# Patient Record
Sex: FEMALE | Employment: OTHER | URBAN - NONMETROPOLITAN AREA
[De-identification: names, ages, dates, MRNs, and addresses within clinical notes are randomized per-mention and may not be internally consistent; named-entity substitution may affect disease eponyms.]

---

## 2017-12-11 ENCOUNTER — HOSPITAL ENCOUNTER (OUTPATIENT)
Dept: MRI IMAGING | Age: 82
Discharge: HOME | End: 2017-12-11
Payer: MEDICARE

## 2017-12-11 DIAGNOSIS — M51.36 DEGENERATION OF INTERVERTEBRAL DISC BETWEEN FOURTH AND FIFTH LUMBAR VERTEBRAE: ICD-10-CM

## 2017-12-11 DIAGNOSIS — M48.062 SPINAL STENOSIS, LUMBAR REGION WITH NEUROGENIC CLAUDICATION: ICD-10-CM

## 2017-12-11 DIAGNOSIS — M43.16 SPONDYLOLISTHESIS OF LUMBAR REGION: ICD-10-CM

## 2017-12-11 PROCEDURE — 72148 MRI LUMBAR SPINE W/O DYE: CPT

## 2021-04-12 ENCOUNTER — HOSPITAL ENCOUNTER (EMERGENCY)
Age: 86
Discharge: HOME | End: 2021-04-12
Attending: EMERGENCY MEDICINE
Payer: MEDICARE

## 2021-04-12 ENCOUNTER — APPOINTMENT (OUTPATIENT)
Dept: CT IMAGING | Age: 86
End: 2021-04-12
Payer: MEDICARE

## 2021-04-12 VITALS
BODY MASS INDEX: 30.73 KG/M2 | WEIGHT: 180 LBS | TEMPERATURE: 97.6 F | DIASTOLIC BLOOD PRESSURE: 52 MMHG | OXYGEN SATURATION: 99 % | SYSTOLIC BLOOD PRESSURE: 144 MMHG | HEIGHT: 64 IN | RESPIRATION RATE: 16 BRPM | HEART RATE: 67 BPM

## 2021-04-12 DIAGNOSIS — M54.2 NECK PAIN: ICD-10-CM

## 2021-04-12 DIAGNOSIS — S01.511A LIP LACERATION, INITIAL ENCOUNTER: ICD-10-CM
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
## 2021-04-12 PROCEDURE — 6370000000 HC RX 637 (ALT 250 FOR IP): Performed by: EMERGENCY MEDICINE

## 2021-04-12 PROCEDURE — 2500000003 HC RX 250 WO HCPCS: Performed by: EMERGENCY MEDICINE

## 2021-04-12 PROCEDURE — 72125 CT NECK SPINE W/O DYE: CPT

## 2021-04-12 PROCEDURE — 90471 IMMUNIZATION ADMIN: CPT | Performed by: EMERGENCY MEDICINE

## 2021-04-12 PROCEDURE — 70450 CT HEAD/BRAIN W/O DYE: CPT

## 2021-04-12 PROCEDURE — 12013 RPR F/E/E/N/L/M 2.6-5.0 CM: CPT

## 2021-04-12 PROCEDURE — 6370000000 HC RX 637 (ALT 250 FOR IP)

## 2021-04-12 PROCEDURE — 6360000002 HC RX W HCPCS: Performed by: EMERGENCY MEDICINE

## 2021-04-12 PROCEDURE — 90715 TDAP VACCINE 7 YRS/> IM: CPT | Performed by: EMERGENCY MEDICINE

## 2021-04-12 PROCEDURE — 99284 EMERGENCY DEPT VISIT MOD MDM: CPT

## 2021-04-12 RX ORDER — LIDOCAINE HYDROCHLORIDE AND EPINEPHRINE 10; 10 MG/ML; UG/ML
20 INJECTION, SOLUTION INFILTRATION; PERINEURAL ONCE
Status: COMPLETED | OUTPATIENT
Start: 2021-04-12 | End: 2021-04-12

## 2021-04-12 RX ORDER — HYDROCODONE BITARTRATE AND ACETAMINOPHEN 5; 325 MG/1; MG/1
1 TABLET ORAL ONCE
Status: DISCONTINUED | OUTPATIENT
Start: 2021-04-12 | End: 2021-04-12 | Stop reason: HOSPADM

## 2021-04-12 RX ORDER — AMOXICILLIN 500 MG/1
500 CAPSULE ORAL 3 TIMES DAILY
Qty: 21 CAPSULE | Refills: 0 | Status: SHIPPED | OUTPATIENT
Start: 2021-04-12 | End: 2021-04-19

## 2021-04-12 RX ORDER — ROSUVASTATIN CALCIUM 20 MG/1
20 TABLET, COATED ORAL DAILY
COMMUNITY

## 2021-04-12 RX ORDER — HYDROCODONE BITARTRATE AND ACETAMINOPHEN 5; 325 MG/1; MG/1
TABLET ORAL
Status: DISCONTINUED
Start: 2021-04-12 | End: 2021-04-12 | Stop reason: HOSPADM

## 2021-04-12 RX ORDER — ASPIRIN 81 MG/1
162 TABLET, CHEWABLE ORAL EVERY OTHER DAY
COMMUNITY

## 2021-04-12 RX ORDER — IBUPROFEN 800 MG/1
800 TABLET ORAL ONCE
Status: DISCONTINUED | OUTPATIENT
Start: 2021-04-12 | End: 2021-04-12 | Stop reason: HOSPADM

## 2021-04-12 RX ORDER — AMOXICILLIN 250 MG/1
500 CAPSULE ORAL ONCE
Status: COMPLETED | OUTPATIENT
Start: 2021-04-12 | End: 2021-04-12

## 2021-04-12 RX ORDER — HYDROCODONE BITARTRATE AND ACETAMINOPHEN 5; 325 MG/1; MG/1
TABLET ORAL
Status: COMPLETED
Start: 2021-04-12 | End: 2021-04-12

## 2021-04-12 RX ORDER — ACETAMINOPHEN 500 MG
500 TABLET ORAL EVERY 6 HOURS PRN
COMMUNITY

## 2021-04-12 RX ADMIN — HYDROCODONE BITARTRATE AND ACETAMINOPHEN 1 TABLET: 5; 325 TABLET ORAL at 12:08

## 2021-04-12 RX ADMIN — TETANUS TOXOID, REDUCED DIPHTHERIA TOXOID AND ACELLULAR PERTUSSIS VACCINE, ADSORBED 0.5 ML: 5; 2.5; 8; 8; 2.5 SUSPENSION INTRAMUSCULAR at 12:11

## 2021-04-12 RX ADMIN — AMOXICILLIN 500 MG: 250 CAPSULE ORAL at 12:52

## 2021-04-12 RX ADMIN — LIDOCAINE HYDROCHLORIDE,EPINEPHRINE BITARTRATE 20 ML: 10; .01 INJECTION, SOLUTION INFILTRATION; PERINEURAL at 12:11

## 2021-04-12 ASSESSMENT — PAIN DESCRIPTION - LOCATION
LOCATION: HEAD;NECK
LOCATION: NECK

## 2021-04-12 ASSESSMENT — PAIN DESCRIPTION - FREQUENCY
FREQUENCY: CONTINUOUS
FREQUENCY: CONTINUOUS

## 2021-04-12 ASSESSMENT — PAIN SCALES - GENERAL
PAINLEVEL_OUTOF10: 5
PAINLEVEL_OUTOF10: 5

## 2021-04-12 NOTE — ED NOTES
Dr. Blanquita Silva suturing wounds. Very large wound of inner mid upper lip, approximately 2.2sdj7bv gaping wound. Approximately 1 cm long wound of outer upper lip. Pt states her posterior neck pain is now 10/10. Pt still denies pain of extremities. C-collar removed per Dr. Jerome Serve instructions.       Erna Estrella, ÁLVARO  04/12/21 0496

## 2021-04-12 NOTE — ED NOTES
Pt pink, warm and dry, breathing with ease. Ice encouraged to posterior neck every hour for 20 min. One norco sent with pt to take at 1700 as directed by Dr. Edenilson Perea. Pt's son lives with her and will keep an eye on her today and through the night. Prescription explained, pt states understanding. AVS reviewed including follow up appointments, diagnosis, and care of self at home. Denies questions or concerns. Pt remains alert and oriented. Pt discharged in stable condition per Julio Cesar Newberry with her son.       Bekah York RN  04/12/21 [id number]

## 2021-04-12 NOTE — ED PROVIDER NOTES
[address]  [address] [facility] [address]  Phone: [phone]    eMERGENCY dEPARTMENT eNCOUnter           [address] [facility]       Chief Complaint   Patient presents with    Lip Laceration     inner and outer lip laceration from falling out of WC.     Neck Pain     c/o neck pain 5/10. Nurses Notes reviewed and I agree except as noted in the HPI. HISTORY OF PRESENT ILLNESS    Javy Hu is a 80 y.o. female who    who presented via EMS. Chief complaint: Fall, facial injuries, headache, neck pain. She fell at home prior to arrival.  She stated that she was trying to sit in her wheelchair when the chair moved from underneath her. She felt and hit her face on the floor. She had a nosebleed but she denies nose pain. She sustained a laceration to her upper lip she is complaining of mild headache and mild neck pain. She describes her neck pain as mild sharp pain which is worse with any movement. Pain did not radiate anywhere. She has no focal weakness or numbness. She has no loss of consciousness. She denies chest pain or shortness of breath. REVIEW OF SYSTEMS     Review of Systems   Constitutional: Negative for chills and fever. HENT: Positive for nosebleeds. Negative for sore throat. Upper lip laceration   Eyes: Negative for pain and discharge. Respiratory: Negative for shortness of breath and wheezing. Cardiovascular: Negative for chest pain and palpitations. Gastrointestinal: Negative for abdominal pain, blood in stool, diarrhea, nausea and vomiting. Genitourinary: Negative for dysuria and hematuria. Musculoskeletal: Positive for neck pain. Negative for back pain. No back or hip pain. Skin: Positive for wound. Neurological: Positive for headaches. Negative for seizures and syncope. Psychiatric/Behavioral: Negative for confusion. PAST MEDICAL HISTORY    has a past medical history of Hyperlipidemia.     SURGICAL HISTORY      has a past surgical history that includes Hysterectomy (1979) and Appendectomy. CURRENT MEDICATIONS       Previous Medications    ACETAMINOPHEN (TYLENOL) 500 MG TABLET    Take 500 mg by mouth every 6 hours as needed for Pain    ASPIRIN 81 MG CHEWABLE TABLET    Take 162 mg by mouth every other day Has been taking 2 every other day. DULOXETINE HCL (CYMBALTA PO)    Take by mouth as needed    ROSUVASTATIN (CRESTOR) 20 MG TABLET    Take 20 mg by mouth daily       ALLERGIES     is allergic to bactrim [sulfamethoxazole-trimethoprim] and lipitor [atorvastatin]. FAMILY HISTORY     has no family status information on file. family history is not on file. SOCIAL HISTORY      reports that she has never smoked. She has never used smokeless tobacco. She reports that she does not drink alcohol. PHYSICAL EXAM     INITIAL VITALS:  height is 5' 4\" (1.626 m) and weight is 180 lb (81.6 kg). Her temporal temperature is 97.6 °F (36.4 °C). Her blood pressure is 144/52 (abnormal) and her pulse is 67. Her respiration is 16 and oxygen saturation is 99%. Physical Exam   Constitutional: She is oriented to person, place, and time. She appears well-developed and well-nourished. She appears distressed. HENT:   No sign of head trauma. Facial examination showed slightly swollen nose but no nasal tenderness. There is small amount of dried blood in the right nares, no preseptal hematoma. No active bleeding. There is upper lip through and through laceration. There is 0.8 zigzag looking laceration involving the philtrum which communicates with the muscle and the inner mucosa. The inner lip laceration measures 1.5 cm and it is through and through as mentioned above. There is no dental injury or lucency. Eyes: Pupils are equal, round, and reactive to light. Conjunctivae are normal.   Neck: No JVD present. No tracheal deviation present. No thyromegaly present.    C-spine was examined and was in line

## 2024-08-09 ENCOUNTER — HOSPITAL ENCOUNTER (EMERGENCY)
Age: 89
Discharge: HOME | End: 2024-08-09
Attending: FAMILY MEDICINE
Payer: MEDICARE

## 2024-08-09 ENCOUNTER — APPOINTMENT (OUTPATIENT)
Dept: CT IMAGING | Age: 89
End: 2024-08-09
Payer: MEDICARE

## 2024-08-09 VITALS
HEART RATE: 58 BPM | BODY MASS INDEX: 24.99 KG/M2 | OXYGEN SATURATION: 95 % | RESPIRATION RATE: 16 BRPM | TEMPERATURE: 97 F | WEIGHT: 150 LBS | DIASTOLIC BLOOD PRESSURE: 59 MMHG | SYSTOLIC BLOOD PRESSURE: 138 MMHG | HEIGHT: 65 IN

## 2024-08-09 DIAGNOSIS — S00.01XA SCALP ABRASION, INITIAL ENCOUNTER: Primary | ICD-10-CM

## 2024-08-09 PROCEDURE — 72125 CT NECK SPINE W/O DYE: CPT

## 2024-08-09 PROCEDURE — 70450 CT HEAD/BRAIN W/O DYE: CPT

## 2024-08-09 PROCEDURE — 99284 EMERGENCY DEPT VISIT MOD MDM: CPT

## 2024-08-09 ASSESSMENT — ENCOUNTER SYMPTOMS
VOMITING: 0
COUGH: 0
SHORTNESS OF BREATH: 0
NAUSEA: 0

## 2024-08-09 ASSESSMENT — PAIN - FUNCTIONAL ASSESSMENT: PAIN_FUNCTIONAL_ASSESSMENT: 0-10

## 2024-08-09 ASSESSMENT — PAIN SCALES - GENERAL: PAINLEVEL_OUTOF10: 5

## 2024-08-09 ASSESSMENT — PAIN DESCRIPTION - LOCATION: LOCATION: HEAD

## 2024-08-09 NOTE — ED NOTES
AVS rev'd with pt. And pt's son and copy given. Pulse regular. Extremities warm. Respirations regular and quiet.  Mucous membranes pink & moist. Alert and oriented times 3.  No nausea or vomiting. Range of motion within patient's limits. Skin pink, warm and dry.  Calm and cooperative.

## 2024-08-09 NOTE — DISCHARGE INSTR - COC
Continuity of Care Form    Patient Name: Javy Murcia   :  1934  MRN:  [id number]    Admit date:  2024  Discharge date:  ***    Code Status Order: No Order   Advance Directives:     Admitting Physician:  No admitting provider for patient encounter.  PCP: Geraldine Garcia, APRN - CNP    Discharging Nurse: ***  Discharging Hospital Unit/Room#: 1TR/TR1  Discharging Unit Phone Number: ***    Emergency Contact:   Extended Emergency Contact Information  Primary Emergency Contact: Joseph Murcia  Address: [address] [place] [id number] [facility]  Home Phone: [phone]  Relation: Child    Past Surgical History:  Past Surgical History:   Procedure Laterality Date    APPENDECTOMY      HYSTERECTOMY (CERVIX STATUS UNKNOWN)         Immunization History:   Immunization History   Administered Date(s) Administered    TDaP, ADACEL (age 10y-64y), BOOSTRIX (age 10y+), IM, 0.5mL 2021       Active Problems:  There is no problem list on file for this patient.      Isolation/Infection:   Isolation            No Isolation          Patient Infection Status       None to display            Nurse Assessment:  Last Vital Signs: BP (!) 138/59   Pulse 58   Temp 97 °F (36.1 °C) (Temporal)   Resp 16   Ht 1.651 m (5' 5\")   Wt 68 kg (150 lb)   SpO2 95%   BMI 24.96 kg/m²     Last documented pain score (0-10 scale): Pain Level: 5  Last Weight:   Wt Readings from Last 1 Encounters:   24 68 kg (150 lb)     Mental Status:  {IP PT MENTAL STATUS:}    IV Access:  { AMI IV ACCESS:575747206}    Nursing Mobility/ADLs:  Walking   {CHP DME ADLs:321183976}  Transfer  {CHP DME ADLs:002749913}  Bathing  {CHP DME ADLs:259000264}  Dressing  {CHP DME ADLs:782016712}  Toileting  {CHP DME ADLs:977863805}  Feeding  {CHP DME ADLs:230388342}  Med Admin  {CHP DME ADLs:051594418}  Med Delivery   { AMI MED Delivery:450305453}    Wound Care Documentation and Therapy:        Elimination:  Continence:

## 2024-08-09 NOTE — ED PROVIDER NOTES
[facility]  eMERGENCY dEPARTMENT eNCOUnter          CHIEF COMPLAINT       Chief Complaint   Patient presents with    Fall    Head Injury    Laceration       Nurses Notes reviewed and I agree except as noted in the HPI.      HISTORY OF PRESENT ILLNESS    Javy Murcia is a 89 y.o. female who presents post fall. Patient tripped and fell striking left head. No LOC. No new neck pain. No extremity pain. Patient notes head did bleed after fall. No nausea,vomiting.          REVIEW OF SYSTEMS     Review of Systems   Constitutional:  Negative for chills and fever.   Respiratory:  Negative for cough and shortness of breath.    Cardiovascular:  Negative for chest pain and palpitations.   Gastrointestinal:  Negative for nausea and vomiting.   Musculoskeletal:  Negative for neck pain and neck stiffness.   Skin:  Positive for wound (abrasion left side of skull).   Neurological:  Negative for dizziness, syncope and light-headedness.   Hematological:  Does not bruise/bleed easily.   Psychiatric/Behavioral:  Negative for agitation and behavioral problems.    All other systems reviewed and are negative.        PAST MEDICAL HISTORY    has a past medical history of Hyperlipidemia.    SURGICAL HISTORY      has a past surgical history that includes Hysterectomy (1979) and Appendectomy.    CURRENT MEDICATIONS       Previous Medications    ACETAMINOPHEN (TYLENOL) 500 MG TABLET    Take 500 mg by mouth every 6 hours as needed for Pain    ASPIRIN 81 MG CHEWABLE TABLET    Take 162 mg by mouth every other day Has been taking 2 every other day.    DULOXETINE HCL (CYMBALTA PO)    Take by mouth as needed    ROSUVASTATIN (CRESTOR) 20 MG TABLET    Take 20 mg by mouth daily       ALLERGIES     is allergic to bactrim [sulfamethoxazole-trimethoprim] and lipitor [atorvastatin].    FAMILY HISTORY     has no family status information on file.    family history is not on file.    SOCIAL HISTORY      reports that she has never smoked.

## 2024-08-09 NOTE — ED NOTES
Pt. Presents to ED  accompanied per son ambulatory with cane.  Pt. Reports she tripped over he feet and fell, hitting left side of head on piece of furniture, no LOC, c/o h/a, no blurred vision.

## 2024-08-09 NOTE — DISCHARGE INSTRUCTIONS
Watch area on scalp for any redness,discharge,fever as these could be signs of infection. Watch for any changes in mental status,recurrent vomiting as these could be signs of brain abnormalities that require a return to ED.

## 2024-08-14 ENCOUNTER — HOSPITAL ENCOUNTER (OUTPATIENT)
Dept: ULTRASOUND IMAGING | Age: 89
Discharge: HOME | End: 2024-08-14
Payer: MEDICARE

## 2024-08-14 DIAGNOSIS — E05.90 SUBCLINICAL HYPERTHYROIDISM: ICD-10-CM

## 2024-08-14 PROCEDURE — 76536 US EXAM OF HEAD AND NECK: CPT
